# Patient Record
Sex: FEMALE | Race: OTHER | NOT HISPANIC OR LATINO
[De-identification: names, ages, dates, MRNs, and addresses within clinical notes are randomized per-mention and may not be internally consistent; named-entity substitution may affect disease eponyms.]

---

## 2021-02-05 ENCOUNTER — APPOINTMENT (OUTPATIENT)
Dept: OTOLARYNGOLOGY | Facility: CLINIC | Age: 51
End: 2021-02-05
Payer: MEDICAID

## 2021-02-05 VITALS
HEART RATE: 94 BPM | BODY MASS INDEX: 34.73 KG/M2 | DIASTOLIC BLOOD PRESSURE: 94 MMHG | WEIGHT: 196 LBS | SYSTOLIC BLOOD PRESSURE: 124 MMHG | HEIGHT: 63 IN | TEMPERATURE: 96.3 F

## 2021-02-05 DIAGNOSIS — Z78.9 OTHER SPECIFIED HEALTH STATUS: ICD-10-CM

## 2021-02-05 PROCEDURE — 99072 ADDL SUPL MATRL&STAF TM PHE: CPT

## 2021-02-05 PROCEDURE — 99204 OFFICE O/P NEW MOD 45 MIN: CPT | Mod: 25

## 2021-02-05 PROCEDURE — 31231 NASAL ENDOSCOPY DX: CPT

## 2021-02-05 NOTE — ASSESSMENT
[FreeTextEntry1] : 50F here for initial evaluation. She c/o foul nasal odor, thick nasal crusting, blood tinged mucus and congestion for several months. She was told recently that there is a 'hole' in her nose and she is here for evaluation. She has had these sx for nearly 9-10 months.\par In March 2020 she had severe nosebleed - bleeding from nose and mouth, mostly from the left side. She went to ED at [facility] and nasal packing was placed. That was left in place for 1 week and removed, but she rebled 2 days later and was repacked, all while staying in the hospital. This second round of packing was left in place for another week and removed, but she rebled 2-3 days later and was repacked a third time for 7 days; she was finally discharged roughly 4 weeks later with third round of packing, which was removed several days later and she has not rebled since then. However, since this ordeal, she has had the above sx and is here for evaluation.\par On exam, there is minimal saddle nose. Nasal endoscopy shows a 2x2cm anteroinferior septal perforation w surrounding thick crusting and mucus, left septal deflection and a bulbous large right jaspal. The rest of the head and neck exam is unremarkable.\par Her sx are due to anterior septal perforation leading to poor nasal environment and hygiene. 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

## 2021-02-05 NOTE — PHYSICAL EXAM
[Nasal Endoscopy Performed] : nasal endoscopy was performed, see procedure section for findings [Midline] : trachea located in midline position [Normal] : no rashes [de-identified] : slight saddle?

## 2021-02-05 NOTE — CONSULT LETTER
[Dear  ___] : Dear  [unfilled], [Courtesy Letter:] : I had the pleasure of seeing your patient, [unfilled], in my office today. [Consult Closing:] : Thank you very much for allowing me to participate in the care of this patient.  If you have any questions, please do not hesitate to contact me. [Sincerely,] : Sincerely, [FreeTextEntry3] : Julian Lyle MD\par Department of Otolaryngology - Head and Neck Surgery\par [facility]

## 2021-02-05 NOTE — PROCEDURE
[FreeTextEntry3] : Nasal Endoscopy:\par ~2x2cm anteroinferior septal perforation w surrounding thick crusting and mucus, suctioned\par left septal deflection\par bulbous large right MT/jaspal?\par no mucosal masses/lesions\par choana clear, no bleeding

## 2021-02-05 NOTE — HISTORY OF PRESENT ILLNESS
[de-identified] : 50F here for initial evaluation.\par \par She c/o foul nasal odor, thick nasal crusting, blood tinged mucus and congestion for several months.\par She was told recently that there is a 'hole' in her nose and she is here for evaluation.\par \par In March 2020 she had severe nosebleed - bleeding from nose and mouth, mostly from the left side. She went to ED at [facility] and nasal packing was placed. That was left in place for 1 week and removed, but she rebled 2 days later and was repacked, all while staying in the hospital. This second round of packing was left in place for another week and removed, but she rebled 2-3 days later and was repacked a third time for 7 days; she was finally discharged roughly 4 weeks later with packing, which was removed several days later and she has not rebled since then.\par However, since this ordeal, she has had the above sx and is here for evaluation.\par There is no pain.\par No imaging.\par \par ROS otherwise unremarkable.

## 2021-02-13 ENCOUNTER — APPOINTMENT (OUTPATIENT)
Dept: CT IMAGING | Facility: HOSPITAL | Age: 51
End: 2021-02-13

## 2021-02-13 ENCOUNTER — OUTPATIENT (OUTPATIENT)
Dept: OUTPATIENT SERVICES | Facility: HOSPITAL | Age: 51
LOS: 1 days | End: 2021-02-13
Payer: MEDICAID

## 2021-02-13 PROCEDURE — 70486 CT MAXILLOFACIAL W/O DYE: CPT

## 2021-02-13 PROCEDURE — 70486 CT MAXILLOFACIAL W/O DYE: CPT | Mod: 26

## 2021-03-05 ENCOUNTER — APPOINTMENT (OUTPATIENT)
Dept: OTOLARYNGOLOGY | Facility: CLINIC | Age: 51
End: 2021-03-05
Payer: MEDICAID

## 2021-03-05 VITALS
HEART RATE: 89 BPM | WEIGHT: 196 LBS | TEMPERATURE: 97.3 F | SYSTOLIC BLOOD PRESSURE: 137 MMHG | BODY MASS INDEX: 34.73 KG/M2 | DIASTOLIC BLOOD PRESSURE: 92 MMHG | HEIGHT: 63 IN

## 2021-03-05 PROCEDURE — 99072 ADDL SUPL MATRL&STAF TM PHE: CPT

## 2021-03-05 PROCEDURE — 99214 OFFICE O/P EST MOD 30 MIN: CPT

## 2021-03-05 NOTE — HISTORY OF PRESENT ILLNESS
[de-identified] : 50F here in followup.\par \par She c/o foul nasal odor, thick nasal crusting, blood tinged mucus and congestion for several months.\par She was told recently that there is a 'hole' in her nose and she is here for evaluation.\par \par In March 2020 she had severe nosebleed - bleeding from nose and mouth, mostly from the left side. She went to ED at [facility] and nasal packing was placed. That was left in place for 1 week and removed, but she rebled 2 days later and was repacked, all while staying in the hospital. This second round of packing was left in place for another week and removed, but she rebled 2-3 days later and was repacked a third time for 7 days; she was finally discharged roughly 4 weeks later with packing, which was removed several days later and she has not rebled since then.\par However, since this ordeal, she has had the above sx and is here for evaluation.\par There is no pain.\par \par CT Sinus 2/13/21:\par -leftward septal deviation/spurring with a 2cm septal perforation\par -mild inflammatory sinus disease involving the anterior drainage pathways.\par \par ROS otherwise unremarkable.

## 2021-03-05 NOTE — ASSESSMENT
[FreeTextEntry1] : 50F here in followup. She c/o foul nasal odor, thick nasal crusting, blood tinged mucus and congestion for several months. She was told recently that there is a 'hole' in her nose and she is here for evaluation. She has had these sx for nearly 9-10 months.\par In March 2020 she had severe nosebleed - bleeding from nose and mouth, mostly from the left side. She went to ED at [facility] and nasal packing was placed. That was left in place for 1 week and removed, but she rebled 2 days later and was repacked, all while staying in the hospital. This second round of packing was left in place for another week and removed, but she rebled 2-3 days later and was repacked a third time for 7 days; she was finally discharged roughly 4 weeks later with third round of packing, which was removed several days later and she has not rebled since then. However, since this ordeal, she has had the above sx and is here for evaluation.\par CT sinus shows leftward septal deviation/spurring with a 2x2cm anterior septal perforation; there is mild inflammatory sinus disease involving the anterior drainage pathways.\par On exam, there is minimal saddle nose. Nasal endoscopy shows a 2x2cm anteroinferior septal perforation w surrounding thick crusting and mucus, left septal deflection and a bulbous large right jaspal. The rest of the head and neck exam is unremarkable.\par Her constellation of sx are due to anterior septal perforation leading to poor nasal environment and hygiene. 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 At the same time, will due septoplasty and assure middle meatal patency as well given CT. This was discussed at length and all questions answered. Plan for OR in the next 2 months.

## 2021-03-05 NOTE — CONSULT LETTER
[Dear  ___] : Dear  [unfilled], [Courtesy Letter:] : I had the pleasure of seeing your patient, [unfilled], in my office today. [Consult Closing:] : Thank you very much for allowing me to participate in the care of this patient.  If you have any questions, please do not hesitate to contact me. [Sincerely,] : Sincerely, [FreeTextEntry3] : Julian Lyle MD\par Department of Otolaryngology - Head and Neck Surgery\par [facility]

## 2021-03-05 NOTE — PHYSICAL EXAM
[Nasal Endoscopy Performed] : nasal endoscopy was performed, see procedure section for findings [de-identified] : slight saddle? [Midline] : trachea located in midline position [Normal] : no rashes

## 2021-03-05 NOTE — DATA REVIEWED
[de-identified] : CT Sinus 2/13/21:\par FINDINGS: The CT examination demonstrates the nasal septum to be deviated to the left. There is a approximately 18 mm defect within the cartilaginous nasal septum compatible with septal perforation. There is a small left-sided osseous spur. The cribriform plate is intact. The roof of the ethmoid sinus is lower. The rest of the osseous structures appear within normal limits. The vertical struts of the right middle turbinate is pneumatized with a twisted configuration. There is hypertrophy of the left middle turbinate.\par \par The frontal sinuses are well developed. There is an accessory septae within the right frontal sinus. There is a small intraseptal air cell which drains into the left frontal sinus. There is mucosal thickening within the right frontal sinus extending to the frontal sinus outflow tract. There is mucosal thickening within the left frontal sinus extending to the frontal sinus outflow tract. 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 There is mucosal thickening within anterior ethmoid air cells bilaterally. The left sphenoid sinus is underdeveloped with the right being dominant. The sphenoid sinus is clear. The sphenoethmoidal recesses are free of mucosal disease. The carotid canals are well covered by bone.\par \par The left maxillary sinus is underdeveloped. There is minimum mucosal thickening within the maxillary sinuses bilaterally (right greater than left). The osteomeatal complexes are free of mucosal disease.\par \par Evaluation of the nasopharynx demonstrates no masses.\par \par Limited evaluation of the brain parenchyma demonstrates no abnormalities.\par \par IMPRESSION: Leftward septal deviation with a approximately 18 mm septal perforation. Mild inflammatory sinus disease involving the anterior drainage pathways.

## 2021-04-15 ENCOUNTER — APPOINTMENT (OUTPATIENT)
Dept: OTOLARYNGOLOGY | Facility: CLINIC | Age: 51
End: 2021-04-15
Payer: MEDICAID

## 2021-04-15 VITALS
TEMPERATURE: 98 F | HEIGHT: 63 IN | WEIGHT: 196 LBS | DIASTOLIC BLOOD PRESSURE: 84 MMHG | BODY MASS INDEX: 34.73 KG/M2 | SYSTOLIC BLOOD PRESSURE: 141 MMHG | HEART RATE: 89 BPM

## 2021-04-15 PROCEDURE — 99072 ADDL SUPL MATRL&STAF TM PHE: CPT

## 2021-04-15 PROCEDURE — 31231 NASAL ENDOSCOPY DX: CPT

## 2021-04-15 PROCEDURE — 99213 OFFICE O/P EST LOW 20 MIN: CPT | Mod: 25

## 2021-04-15 NOTE — ASSESSMENT
[FreeTextEntry1] : 50F here in followup. Over the past few days she has felt some congestion and headache and wants to make sure everything is ok. She is scheduled for endoscopic sinus surgery/repair anterior septal perforation 4/26/21.\par However, since her sx started, she has been doing better and is improving.\par \par At baseline, she c/o foul nasal odor, thick nasal crusting, blood tinged mucus and congestion for several months. She was told recently that there is a 'hole' in her nose and she is here for evaluation. She has had these sx for nearly 9-10 months.\par In March 2020 she had severe nosebleed - bleeding from nose and mouth, mostly from the left side. She went to ED at [facility] and nasal packing was placed. That was left in place for 1 week and removed, but she rebled 2 days later and was repacked, all while staying in the hospital. This second round of packing was left in place for another week and removed, but she rebled 2-3 days later and was repacked a third time for 7 days; she was finally discharged roughly 4 weeks later with third round of packing, which was removed several days later and she has not rebled since then. However, since this ordeal, she has had the above sx and is here for evaluation.\par CT sinus shows leftward septal deviation/spurring with a 2x2cm anterior septal perforation; there is mild inflammatory sinus disease involving the anterior drainage pathways.\par On exam, there is minimal saddle nose. Nasal endoscopy shows a 2.5x2cm anteroinferior septal perforation w surrounding thick crusting and mucus, left septal deflection and a bulbous large right jaspal. The rest of the head and neck exam is unremarkable. There is no e/o sinusitis today.\par Her constellation of sx are due to anterior septal perforation leading to poor nasal environment and hygiene. 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 At the same time, will due septoplasty and assure middle meatal patency as well given CT. This was discussed at length and all questions answered. Plan for OR as scheduled.

## 2021-04-15 NOTE — PROCEDURE
[FreeTextEntry3] : Nasal Endoscopy:\par ~2.5x2cm anteroinferior septal perforation w surrounding thick crusting and mucus, suctioned\par left septal deflection/spurring, right superior septal deviation\par bulbous large right MT/jaspal, patent right fontanelle\par no mucosal masses/lesions\par no mucopus/polyps\par choana clear, no bleeding

## 2021-04-15 NOTE — DATA REVIEWED
[de-identified] : CT Sinus 2/13/21:\par FINDINGS: The CT examination demonstrates the nasal septum to be deviated to the left. There is a approximately 18 mm defect within the cartilaginous nasal septum compatible with septal perforation. There is a small left-sided osseous spur. The cribriform plate is intact. The roof of the ethmoid sinus is lower. The rest of the osseous structures appear within normal limits. The vertical struts of the right middle turbinate is pneumatized with a twisted configuration. There is hypertrophy of the left middle turbinate.\par \par The frontal sinuses are well developed. There is an accessory septae within the right frontal sinus. There is a small intraseptal air cell which drains into the left frontal sinus. There is mucosal thickening within the right frontal sinus extending to the frontal sinus outflow tract. There is mucosal thickening within the left frontal sinus extending to the frontal sinus outflow tract. 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 There is mucosal thickening within anterior ethmoid air cells bilaterally. The left sphenoid sinus is underdeveloped with the right being dominant. The sphenoid sinus is clear. The sphenoethmoidal recesses are free of mucosal disease. The carotid canals are well covered by bone.\par \par The left maxillary sinus is underdeveloped. There is minimum mucosal thickening within the maxillary sinuses bilaterally (right greater than left). The osteomeatal complexes are free of mucosal disease.\par \par Evaluation of the nasopharynx demonstrates no masses.\par \par Limited evaluation of the brain parenchyma demonstrates no abnormalities.\par \par IMPRESSION: Leftward septal deviation with a approximately 18 mm septal perforation. Mild inflammatory sinus disease involving the anterior drainage pathways.

## 2021-04-15 NOTE — CONSULT LETTER
[Dear  ___] : Dear  [unfilled], [Courtesy Letter:] : I had the pleasure of seeing your patient, [unfilled], in my office today. [Consult Closing:] : Thank you very much for allowing me to participate in the care of this patient.  If you have any questions, please do not hesitate to contact me. [Sincerely,] : Sincerely, [FreeTextEntry3] : Julian Lyle MD\par Department of Otolaryngology - Head and Neck Surgery\par [facility]

## 2021-04-15 NOTE — HISTORY OF PRESENT ILLNESS
[de-identified] : 50F here in followup.\par \par Over the past few days she has felt some congestion and headache and wants to make sure everything is ok. She is scheduled for endoscopic sinus surgery/repair anterior septal perforation 4/26/21.\par Since her sx started, she has been doing better and is improving.\par \par She c/o foul nasal odor, thick nasal crusting, blood tinged mucus and congestion for several months.\par She was told recently that there is a 'hole' in her nose and she is here for evaluation.\par \par In March 2020 she had severe nosebleed - bleeding from nose and mouth, mostly from the left side. She went to ED at [facility] and nasal packing was placed. That was left in place for 1 week and removed, but she rebled 2 days later and was repacked, all while staying in the hospital. This second round of packing was left in place for another week and removed, but she rebled 2-3 days later and was repacked a third time for 7 days; she was finally discharged roughly 4 weeks later with packing, which was removed several days later and she has not rebled since then.\par However, since this ordeal, she has had the above sx and is here for evaluation.\par There is no pain.\par \par CT Sinus 2/13/21:\par -leftward septal deviation/spurring with a 2cm septal perforation\par -mild inflammatory sinus disease involving the anterior drainage pathways.\par \par ROS otherwise unremarkable.

## 2021-04-15 NOTE — PHYSICAL EXAM
[Nasal Endoscopy Performed] : nasal endoscopy was performed, see procedure section for findings [de-identified] : slight saddle? [Midline] : trachea located in midline position [Normal] : no rashes

## 2021-04-25 ENCOUNTER — TRANSCRIPTION ENCOUNTER (OUTPATIENT)
Age: 51
End: 2021-04-25

## 2021-04-26 ENCOUNTER — OUTPATIENT (OUTPATIENT)
Dept: OUTPATIENT SERVICES | Facility: HOSPITAL | Age: 51
LOS: 1 days | Discharge: HOME | End: 2021-04-26
Payer: MEDICAID

## 2021-04-26 ENCOUNTER — RESULT REVIEW (OUTPATIENT)
Age: 51
End: 2021-04-26

## 2021-04-26 ENCOUNTER — APPOINTMENT (OUTPATIENT)
Dept: OTOLARYNGOLOGY | Facility: CLINIC | Age: 51
End: 2021-04-26

## 2021-04-26 LAB
GRAM STN FLD: SIGNIFICANT CHANGE UP
SPECIMEN SOURCE: SIGNIFICANT CHANGE UP

## 2021-04-26 PROCEDURE — 31256 EXPLORATION MAXILLARY SINUS: CPT | Mod: 50

## 2021-04-26 PROCEDURE — 30520 REPAIR OF NASAL SEPTUM: CPT

## 2021-04-26 PROCEDURE — 61782 SCAN PROC CRANIAL EXTRA: CPT

## 2021-04-26 PROCEDURE — 88305 TISSUE EXAM BY PATHOLOGIST: CPT | Mod: 26

## 2021-04-26 PROCEDURE — 88304 TISSUE EXAM BY PATHOLOGIST: CPT | Mod: 26

## 2021-04-26 PROCEDURE — 88311 DECALCIFY TISSUE: CPT | Mod: 26

## 2021-04-26 PROCEDURE — 30130 EXCISE INFERIOR TURBINATE: CPT | Mod: 50

## 2021-04-26 PROCEDURE — 15576 PEDICLE E/N/E/L/NTRORAL: CPT

## 2021-04-26 PROCEDURE — 88300 SURGICAL PATH GROSS: CPT | Mod: 26,59

## 2021-04-26 PROCEDURE — 31254 NSL/SINS NDSC W/PRTL ETHMDCT: CPT | Mod: 50

## 2021-04-26 PROCEDURE — 30630 REPAIR NASAL SEPTUM DEFECT: CPT

## 2021-04-30 LAB
-  AMOXICILLIN/CLAVULANIC ACID: SIGNIFICANT CHANGE UP
-  CEFTRIAXONE: SIGNIFICANT CHANGE UP
-  CIPROFLOXACIN: SIGNIFICANT CHANGE UP
-  TRIMETHOPRIM/SULFAMETHOXAZOLE: SIGNIFICANT CHANGE UP
CULTURE RESULTS: SIGNIFICANT CHANGE UP
METHOD TYPE: SIGNIFICANT CHANGE UP
ORGANISM # SPEC MICROSCOPIC CNT: SIGNIFICANT CHANGE UP
ORGANISM # SPEC MICROSCOPIC CNT: SIGNIFICANT CHANGE UP
SPECIMEN SOURCE: SIGNIFICANT CHANGE UP

## 2021-05-01 LAB — SURGICAL PATHOLOGY STUDY: SIGNIFICANT CHANGE UP

## 2021-05-06 ENCOUNTER — APPOINTMENT (OUTPATIENT)
Dept: OTOLARYNGOLOGY | Facility: CLINIC | Age: 51
End: 2021-05-06
Payer: MEDICAID

## 2021-05-06 VITALS
HEIGHT: 63 IN | SYSTOLIC BLOOD PRESSURE: 147 MMHG | WEIGHT: 189 LBS | TEMPERATURE: 96.2 F | DIASTOLIC BLOOD PRESSURE: 97 MMHG | BODY MASS INDEX: 33.49 KG/M2 | HEART RATE: 92 BPM

## 2021-05-06 PROCEDURE — 31237 NSL/SINS NDSC SURG BX POLYPC: CPT | Mod: 58

## 2021-05-06 PROCEDURE — 99024 POSTOP FOLLOW-UP VISIT: CPT

## 2021-05-06 NOTE — CONSULT LETTER
[Dear  ___] : Dear  [unfilled], [Courtesy Letter:] : I had the pleasure of seeing your patient, [unfilled], in my office today. [Consult Closing:] : Thank you very much for allowing me to participate in the care of this patient.  If you have any questions, please do not hesitate to contact me. [Sincerely,] : Sincerely, [FreeTextEntry3] : Julian Lyle MD\par Department of Otolaryngology - Head and Neck Surgery\par [facility]

## 2021-05-06 NOTE — PHYSICAL EXAM
[Nasal Endoscopy Performed] : nasal endoscopy was performed, see procedure section for findings [de-identified] : slight saddle? [Midline] : trachea located in midline position [Normal] : no rashes

## 2021-05-06 NOTE — PROCEDURE
[FreeTextEntry3] : minipropels removed\par \par Nasal Endoscopy:\par abundant coagulum and crust removed\par nasal airways patent\par silastics in place; underlying septum/septal flap appears viable and pink w some intervening clot\par middle meati widely patent, no mucopus or polyps

## 2021-05-06 NOTE — ASSESSMENT
[FreeTextEntry1] : 50F here in first postoperative visit s/p ESS (maxillary, anterior ethmoid) and endoscopic repair of 2cm anterior septal perforation with pedicled anterior ethmoid artery flap 4/26/21. Intraoperatively, she had hypoplastic maxillaries; the pedicled flap had adequate perforation closure with minimal tension. She is doing ok since surgery. She took the medications as prescribed. She is congested. Pain is controlled.\par On exam, nasal endoscopy shows patent middle meati; the silastics are in place and the underlying septum/septal flap appears viable and pink w some intervening clot. She felt much better after debridement.\par She is doing well for now. To use saline throughout the day and advance activity. I am cautiously optimistic regarding the flaps viability. RTO 2 weeks for silastic removal.

## 2021-05-06 NOTE — DATA REVIEWED
[de-identified] : CT Sinus 2/13/21:\par FINDINGS: The CT examination demonstrates the nasal septum to be deviated to the left. There is a approximately 18 mm defect within the cartilaginous nasal septum compatible with septal perforation. There is a small left-sided osseous spur. The cribriform plate is intact. The roof of the ethmoid sinus is lower. The rest of the osseous structures appear within normal limits. The vertical struts of the right middle turbinate is pneumatized with a twisted configuration. There is hypertrophy of the left middle turbinate.\par \par The frontal sinuses are well developed. There is an accessory septae within the right frontal sinus. There is a small intraseptal air cell which drains into the left frontal sinus. There is mucosal thickening within the right frontal sinus extending to the frontal sinus outflow tract. There is mucosal thickening within the left frontal sinus extending to the frontal sinus outflow tract. 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 There is mucosal thickening within anterior ethmoid air cells bilaterally. The left sphenoid sinus is underdeveloped with the right being dominant. The sphenoid sinus is clear. The sphenoethmoidal recesses are free of mucosal disease. The carotid canals are well covered by bone.\par \par The left maxillary sinus is underdeveloped. There is minimum mucosal thickening within the maxillary sinuses bilaterally (right greater than left). The osteomeatal complexes are free of mucosal disease.\par \par Evaluation of the nasopharynx demonstrates no masses.\par \par Limited evaluation of the brain parenchyma demonstrates no abnormalities.\par \par IMPRESSION: Leftward septal deviation with a approximately 18 mm septal perforation. Mild inflammatory sinus disease involving the anterior drainage pathways.

## 2021-05-06 NOTE — HISTORY OF PRESENT ILLNESS
[de-identified] : 50F here in first postoperative visit s/p ESS (maxillary, anterior ethmoid) and endoscopic repair of 2cm anterior septal perforation with pedicled anterior ethmoid artery flap 4/26/21. Intraoperatively, she had hypoplastic maxillaries; the pedicled flap had adequate perforation closure with minimal tension.\par \par She is doing ok since surgery. She took the medications as prescribed. She is congested. Pain is controlled.\par \par Pathology:\par 1. Left sinus contents:\par - Bone and respiratory mucosa with thickened subepithelial collagen.\par 2. Left maxillary sinus:\par - Inspissated mucus.\par - No additional tissue represented.\par 3. Right sinus contents:\par - Bone and respiratory mucosa with focally thickened subepithelial collagen.\par 4. Bilateral sinus shavings:\par - Fragments of bone and respiratory mucosa with focally thickened subepithelial collagen.\par 5. Nasal septum:\par - Fragments of nasal bone and cartilage (gross exam ).\par \par ROS otherwise unremarkable.

## 2021-05-21 ENCOUNTER — APPOINTMENT (OUTPATIENT)
Dept: OTOLARYNGOLOGY | Facility: CLINIC | Age: 51
End: 2021-05-21
Payer: MEDICAID

## 2021-05-21 PROCEDURE — 31237 NSL/SINS NDSC SURG BX POLYPC: CPT | Mod: 58

## 2021-05-21 PROCEDURE — 99024 POSTOP FOLLOW-UP VISIT: CPT

## 2021-05-21 NOTE — CONSULT LETTER
[Dear  ___] : Dear  [unfilled], [Courtesy Letter:] : I had the pleasure of seeing your patient, [unfilled], in my office today. [Consult Closing:] : Thank you very much for allowing me to participate in the care of this patient.  If you have any questions, please do not hesitate to contact me. [Sincerely,] : Sincerely, [FreeTextEntry3] : Julian Lyle MD\par Department of Otolaryngology - Head and Neck Surgery\par [facility]

## 2021-05-21 NOTE — PHYSICAL EXAM
[Nasal Endoscopy Performed] : nasal endoscopy was performed, see procedure section for findings [de-identified] : slight saddle? [Midline] : trachea located in midline position [Normal] : no rashes

## 2021-05-21 NOTE — ASSESSMENT
[FreeTextEntry1] : 51F here in second postoperative visit s/p ESS (maxillary, anterior ethmoid) and endoscopic repair of 2cm anterior septal perforation with pedicled anterior ethmoid artery flap 4/26/21. Intraoperatively, she had hypoplastic maxillaries; the pedicled flap had adequate perforation closure with minimal tension. She is doing ok since last seen. She has a sore throat and feels fatigued. She is congested. Pain is controlled.\par On exam, after debridement and silastic removal, nasal endoscopy shows a pink and healthy flap w complete perforation closure! Both middle meati are widely patent and the left nasal floor is granulating. \par She is doing well for now. Start budesonide rinses gently once daily. Mupirocin to vestibule. I am optimistic regarding the flaps viability. RTO 3-4 weeks in routine followup.; current sx should improve now that silastic and crusting has been removed.

## 2021-05-21 NOTE — HISTORY OF PRESENT ILLNESS
[de-identified] : 51F here in second postoperative visit s/p ESS (maxillary, anterior ethmoid) and endoscopic repair of 2cm anterior septal perforation with pedicled anterior ethmoid artery flap 4/26/21. Intraoperatively, she had hypoplastic maxillaries; the pedicled flap had adequate perforation closure with minimal tension.\par \par She is doing ok since last seen. She has a sore throat and feels fatigued. She is congested. Pain is controlled.\par \par ROS otherwise unremarkable.

## 2021-05-21 NOTE — PROCEDURE
[FreeTextEntry3] : silastics removed\par \par Nasal Endoscopy:\par abundant crusting removed\par nasal airways patent\par flap pink and healthy w complete perforation closure!\par middle meati widely patent, no mucopus or polyps\par left nasal floor granulating

## 2021-05-21 NOTE — DATA REVIEWED
[de-identified] : CT Sinus 2/13/21:\par FINDINGS: The CT examination demonstrates the nasal septum to be deviated to the left. There is a approximately 18 mm defect within the cartilaginous nasal septum compatible with septal perforation. There is a small left-sided osseous spur. The cribriform plate is intact. The roof of the ethmoid sinus is lower. The rest of the osseous structures appear within normal limits. The vertical struts of the right middle turbinate is pneumatized with a twisted configuration. There is hypertrophy of the left middle turbinate.\par \par The frontal sinuses are well developed. There is an accessory septae within the right frontal sinus. There is a small intraseptal air cell which drains into the left frontal sinus. There is mucosal thickening within the right frontal sinus extending to the frontal sinus outflow tract. There is mucosal thickening within the left frontal sinus extending to the frontal sinus outflow tract. 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 There is mucosal thickening within anterior ethmoid air cells bilaterally. The left sphenoid sinus is underdeveloped with the right being dominant. The sphenoid sinus is clear. The sphenoethmoidal recesses are free of mucosal disease. The carotid canals are well covered by bone.\par \par The left maxillary sinus is underdeveloped. There is minimum mucosal thickening within the maxillary sinuses bilaterally (right greater than left). The osteomeatal complexes are free of mucosal disease.\par \par Evaluation of the nasopharynx demonstrates no masses.\par \par Limited evaluation of the brain parenchyma demonstrates no abnormalities.\par \par IMPRESSION: Leftward septal deviation with a approximately 18 mm septal perforation. Mild inflammatory sinus disease involving the anterior drainage pathways.

## 2021-06-11 ENCOUNTER — APPOINTMENT (OUTPATIENT)
Dept: OTOLARYNGOLOGY | Facility: CLINIC | Age: 51
End: 2021-06-11
Payer: MEDICAID

## 2021-06-11 VITALS
HEIGHT: 63 IN | DIASTOLIC BLOOD PRESSURE: 93 MMHG | BODY MASS INDEX: 34.02 KG/M2 | SYSTOLIC BLOOD PRESSURE: 141 MMHG | TEMPERATURE: 96 F | WEIGHT: 192 LBS | HEART RATE: 94 BPM

## 2021-06-11 PROCEDURE — 31237 NSL/SINS NDSC SURG BX POLYPC: CPT | Mod: 58

## 2021-06-11 PROCEDURE — 99024 POSTOP FOLLOW-UP VISIT: CPT

## 2021-06-11 NOTE — PHYSICAL EXAM
[Nasal Endoscopy Performed] : nasal endoscopy was performed, see procedure section for findings [de-identified] : slight saddle? [Midline] : trachea located in midline position [Normal] : no rashes

## 2021-06-11 NOTE — PROCEDURE
[FreeTextEntry3] : Nasal Endoscopy:\par mild/moderate crusting removed\par nasal airways patent\par septum intact with flap pink and healthy w complete perforation closure!\par middle meati widely patent, no mucopus or polyps\par left nasal floor and posterior septum mucosalized

## 2021-06-11 NOTE — ASSESSMENT
[FreeTextEntry1] : 51F here in routine postoperative visit s/p ESS (maxillary, anterior ethmoid) and endoscopic repair of 2cm anterior septal perforation with pedicled anterior ethmoid artery flap 4/26/21. Intraoperatively, she had hypoplastic maxillaries; the pedicled flap had adequate perforation closure with minimal tension. She is doing much better since last seen. She is using the budesonide rinses and ointment. There is mild sore throat. Pain is controlled.\par On exam, after debridement, nasal endoscopy shows an intact septum with a healthy flap w complete perforation closure! Both middle meati are widely patent and the left nasal floor/posterior septum are mucosalized.\par She is doing very well and her flap has healed beautifully. Continue budesonide rinses gently once daily. Mupirocin to vestibule. RTO 2 months in routine followup.

## 2021-06-11 NOTE — HISTORY OF PRESENT ILLNESS
[de-identified] : 51F here in routine postoperative visit s/p ESS (maxillary, anterior ethmoid) and endoscopic repair of 2cm anterior septal perforation with pedicled anterior ethmoid artery flap 4/26/21. Intraoperatively, she had hypoplastic maxillaries; the pedicled flap had adequate perforation closure with minimal tension.\par \par She is doing much better since last seen. She is using the budesonide rinses and ointment. There is mild sore throat. Pain is controlled.\par \par ROS otherwise unremarkable.

## 2021-06-11 NOTE — CONSULT LETTER
[Dear  ___] : Dear  [unfilled], [Courtesy Letter:] : I had the pleasure of seeing your patient, [unfilled], in my office today. [Consult Closing:] : Thank you very much for allowing me to participate in the care of this patient.  If you have any questions, please do not hesitate to contact me. [Sincerely,] : Sincerely, [FreeTextEntry3] : Julian Lyle MD\par Department of Otolaryngology - Head and Neck Surgery\par [facility]

## 2021-06-11 NOTE — DATA REVIEWED
[de-identified] : CT Sinus 2/13/21:\par FINDINGS: The CT examination demonstrates the nasal septum to be deviated to the left. There is a approximately 18 mm defect within the cartilaginous nasal septum compatible with septal perforation. There is a small left-sided osseous spur. The cribriform plate is intact. The roof of the ethmoid sinus is lower. The rest of the osseous structures appear within normal limits. The vertical struts of the right middle turbinate is pneumatized with a twisted configuration. There is hypertrophy of the left middle turbinate.\par \par The frontal sinuses are well developed. There is an accessory septae within the right frontal sinus. There is a small intraseptal air cell which drains into the left frontal sinus. There is mucosal thickening within the right frontal sinus extending to the frontal sinus outflow tract. There is mucosal thickening within the left frontal sinus extending to the frontal sinus outflow tract. 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 There is mucosal thickening within anterior ethmoid air cells bilaterally. The left sphenoid sinus is underdeveloped with the right being dominant. The sphenoid sinus is clear. The sphenoethmoidal recesses are free of mucosal disease. The carotid canals are well covered by bone.\par \par The left maxillary sinus is underdeveloped. There is minimum mucosal thickening within the maxillary sinuses bilaterally (right greater than left). The osteomeatal complexes are free of mucosal disease.\par \par Evaluation of the nasopharynx demonstrates no masses.\par \par Limited evaluation of the brain parenchyma demonstrates no abnormalities.\par \par IMPRESSION: Leftward septal deviation with a approximately 18 mm septal perforation. Mild inflammatory sinus disease involving the anterior drainage pathways.

## 2021-07-23 ENCOUNTER — APPOINTMENT (OUTPATIENT)
Dept: OTOLARYNGOLOGY | Facility: CLINIC | Age: 51
End: 2021-07-23
Payer: MEDICAID

## 2021-07-23 VITALS
SYSTOLIC BLOOD PRESSURE: 157 MMHG | HEART RATE: 87 BPM | TEMPERATURE: 97.1 F | DIASTOLIC BLOOD PRESSURE: 97 MMHG | HEIGHT: 63 IN | WEIGHT: 192 LBS | BODY MASS INDEX: 34.02 KG/M2

## 2021-07-23 PROCEDURE — 99213 OFFICE O/P EST LOW 20 MIN: CPT | Mod: 25

## 2021-07-23 PROCEDURE — 31231 NASAL ENDOSCOPY DX: CPT | Mod: 58,52

## 2021-07-23 NOTE — HISTORY OF PRESENT ILLNESS
[de-identified] : 51F here in routine postoperative visit s/p ESS (maxillary, anterior ethmoid) and endoscopic repair of 2cm anterior septal perforation with pedicled anterior ethmoid artery flap 4/26/21. Intraoperatively, she had hypoplastic maxillaries; the pedicled flap had adequate perforation closure with minimal tension.\par \par She is doing very well since last seen. She is using the saline rinses, but stopped w the budesonide. There is mild dry throat.\par \par ROS otherwise unremarkable.

## 2021-07-23 NOTE — ASSESSMENT
[FreeTextEntry1] : 51F here in routine postoperative visit s/p ESS (maxillary, anterior ethmoid) and endoscopic repair of 2cm anterior septal perforation with pedicled anterior ethmoid artery flap 4/26/21. Intraoperatively, she had hypoplastic maxillaries; the pedicled flap had adequate perforation closure with minimal tension. She is doing much better since last seen. She is using saline rinses, but stopped the budesonide. There is mild dry throat.\par On exam, after debridement, nasal endoscopy shows an intact septum with a healthy flap w complete perforation closure! Both middle meati are patent and the left nasal floor/posterior septum are mucosalized w some granulation. She is doing very well and her flap has healed beautifully. Restart budesonide rinses gently once daily. RTO 2 months in routine followup.

## 2021-07-23 NOTE — DATA REVIEWED
[de-identified] : CT Sinus 2/13/21:\par FINDINGS: The CT examination demonstrates the nasal septum to be deviated to the left. There is a approximately 18 mm defect within the cartilaginous nasal septum compatible with septal perforation. There is a small left-sided osseous spur. The cribriform plate is intact. The roof of the ethmoid sinus is lower. The rest of the osseous structures appear within normal limits. The vertical struts of the right middle turbinate is pneumatized with a twisted configuration. There is hypertrophy of the left middle turbinate.\par \par The frontal sinuses are well developed. There is an accessory septae within the right frontal sinus. There is a small intraseptal air cell which drains into the left frontal sinus. There is mucosal thickening within the right frontal sinus extending to the frontal sinus outflow tract. There is mucosal thickening within the left frontal sinus extending to the frontal sinus outflow tract. 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 There is mucosal thickening within anterior ethmoid air cells bilaterally. The left sphenoid sinus is underdeveloped with the right being dominant. The sphenoid sinus is clear. The sphenoethmoidal recesses are free of mucosal disease. The carotid canals are well covered by bone.\par \par The left maxillary sinus is underdeveloped. There is minimum mucosal thickening within the maxillary sinuses bilaterally (right greater than left). The osteomeatal complexes are free of mucosal disease.\par \par Evaluation of the nasopharynx demonstrates no masses.\par \par Limited evaluation of the brain parenchyma demonstrates no abnormalities.\par \par IMPRESSION: Leftward septal deviation with a approximately 18 mm septal perforation. Mild inflammatory sinus disease involving the anterior drainage pathways.

## 2021-07-23 NOTE — PHYSICAL EXAM
[Nasal Endoscopy Performed] : nasal endoscopy was performed, see procedure section for findings [Midline] : trachea located in midline position [Normal] : no rashes [de-identified] : slight saddle?

## 2021-07-23 NOTE — PROCEDURE
[FreeTextEntry3] : Nasal Endoscopy:\par mild/moderate left>right crusting removed\par nasal airways patent\par septum intact with flap pink and healthy w complete perforation closure\par middle meati widely patent, no mucopus or polyps\par left nasal floor and posterior septum mucosalizing w some granulation

## 2021-09-24 ENCOUNTER — APPOINTMENT (OUTPATIENT)
Dept: OTOLARYNGOLOGY | Facility: CLINIC | Age: 51
End: 2021-09-24
Payer: MEDICAID

## 2021-09-24 PROCEDURE — 31231 NASAL ENDOSCOPY DX: CPT

## 2021-09-24 PROCEDURE — 99213 OFFICE O/P EST LOW 20 MIN: CPT | Mod: 25

## 2021-09-24 NOTE — PHYSICAL EXAM
[Nasal Endoscopy Performed] : nasal endoscopy was performed, see procedure section for findings [de-identified] : slight saddle? [Midline] : trachea located in midline position [Normal] : no rashes

## 2021-09-24 NOTE — PROCEDURE
[FreeTextEntry3] : Nasal Endoscopy:\par minimal crusting\par nasal airways patent\par septum intact with flap pink and healthy w complete perforation closure!\par middle meati widely patent, no mucopus or polyps\par left nasal floor and posterior septum mucosalized; mild synechiae left nasal floor/posterior IT

## 2021-09-24 NOTE — HISTORY OF PRESENT ILLNESS
[de-identified] : 51F here in routine postoperative visit s/p ESS (maxillary, anterior ethmoid) and endoscopic repair of 2cm anterior septal perforation with pedicled anterior ethmoid artery flap 4/26/21. Intraoperatively, she had hypoplastic maxillaries; the pedicled flap had adequate perforation closure with minimal tension.\par \par She is doing very well since last seen. She is using the budesonide rinses. She feels very well and is breathing great.\par \par ROS otherwise unremarkable.

## 2021-09-24 NOTE — DATA REVIEWED
[de-identified] : CT Sinus 2/13/21:\par FINDINGS: The CT examination demonstrates the nasal septum to be deviated to the left. There is a approximately 18 mm defect within the cartilaginous nasal septum compatible with septal perforation. There is a small left-sided osseous spur. The cribriform plate is intact. The roof of the ethmoid sinus is lower. The rest of the osseous structures appear within normal limits. The vertical struts of the right middle turbinate is pneumatized with a twisted configuration. There is hypertrophy of the left middle turbinate.\par \par The frontal sinuses are well developed. There is an accessory septae within the right frontal sinus. There is a small intraseptal air cell which drains into the left frontal sinus. There is mucosal thickening within the right frontal sinus extending to the frontal sinus outflow tract. There is mucosal thickening within the left frontal sinus extending to the frontal sinus outflow tract. 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 There is mucosal thickening within anterior ethmoid air cells bilaterally. The left sphenoid sinus is underdeveloped with the right being dominant. The sphenoid sinus is clear. The sphenoethmoidal recesses are free of mucosal disease. The carotid canals are well covered by bone.\par \par The left maxillary sinus is underdeveloped. There is minimum mucosal thickening within the maxillary sinuses bilaterally (right greater than left). The osteomeatal complexes are free of mucosal disease.\par \par Evaluation of the nasopharynx demonstrates no masses.\par \par Limited evaluation of the brain parenchyma demonstrates no abnormalities.\par \par IMPRESSION: Leftward septal deviation with a approximately 18 mm septal perforation. Mild inflammatory sinus disease involving the anterior drainage pathways.

## 2021-09-24 NOTE — ASSESSMENT
[FreeTextEntry1] : 51F here in routine postoperative visit s/p ESS (maxillary, anterior ethmoid) and endoscopic repair of 2cm anterior septal perforation with pedicled anterior ethmoid artery flap 4/26/21. Intraoperatively, she had hypoplastic maxillaries; the pedicled flap had adequate perforation closure with minimal tension. She is doing very well since last seen 2 months ago. She is using the budesonide rinses. She feels very well and is breathing great.\par On exam, nasal endoscopy shows well healed postoperative changes with an intact septum and a healthy flap w complete perforation closure! Both middle meati are patent and the left nasal floor/posterior septum are mucosalized. She is doing very well and her flap has healed beautifully. Continue budesonide rinses as needed. To use mupirocin ointment over the left vestibule. RTO 4 months in routine followup.

## 2021-09-24 NOTE — CONSULT LETTER
[Dear  ___] : Dear  [unfilled], [Courtesy Letter:] : I had the pleasure of seeing your patient, [unfilled], in my office today. [Consult Closing:] : Thank you very much for allowing me to participate in the care of this patient.  If you have any questions, please do not hesitate to contact me. [Sincerely,] : Sincerely, [FreeTextEntry3] : Julian Lyle MD\par Department of Otolaryngology - Head and Neck Surgery\par [facility]

## 2021-11-03 ENCOUNTER — APPOINTMENT (OUTPATIENT)
Dept: PULMONOLOGY | Facility: CLINIC | Age: 51
End: 2021-11-03
Payer: MEDICAID

## 2021-11-03 VITALS
TEMPERATURE: 96.9 F | HEIGHT: 60 IN | BODY MASS INDEX: 36.71 KG/M2 | OXYGEN SATURATION: 98 % | DIASTOLIC BLOOD PRESSURE: 76 MMHG | HEART RATE: 117 BPM | WEIGHT: 187 LBS | SYSTOLIC BLOOD PRESSURE: 104 MMHG

## 2021-11-03 DIAGNOSIS — R05.3 CHRONIC COUGH: ICD-10-CM

## 2021-11-03 PROCEDURE — 95012 NITRIC OXIDE EXP GAS DETER: CPT

## 2021-11-03 PROCEDURE — 99204 OFFICE O/P NEW MOD 45 MIN: CPT | Mod: 25,GC

## 2021-11-03 PROCEDURE — 94729 DIFFUSING CAPACITY: CPT

## 2021-11-03 PROCEDURE — 94727 GAS DIL/WSHOT DETER LNG VOL: CPT

## 2021-11-03 PROCEDURE — 71046 X-RAY EXAM CHEST 2 VIEWS: CPT

## 2021-11-03 PROCEDURE — 94060 EVALUATION OF WHEEZING: CPT

## 2021-11-04 NOTE — PHYSICAL EXAM
[No Acute Distress] : no acute distress [Normal Appearance] : normal appearance [No Neck Mass] : no neck mass [Normal Rate/Rhythm] : normal rate/rhythm [Normal S1, S2] : normal s1, s2 [No Murmurs] : no murmurs [No Resp Distress] : no resp distress [No Abnormalities] : no abnormalities [Benign] : benign [No Clubbing] : no clubbing [No Edema] : no edema [Normal Color/ Pigmentation] : normal color/ pigmentation [Normal Affect] : normal affect [TextBox_68] : Trace basilar inspiratory crackles

## 2021-11-04 NOTE — CONSULT LETTER
[Dear  ___] : Dear  [unfilled], [( Thank you for referring [unfilled] for consultation for _____ )] : Thank you for referring [unfilled] for consultation for [unfilled] [Please see my note below.] : Please see my note below. [Consult Closing:] : Thank you very much for allowing me to participate in the care of this patient.  If you have any questions, please do not hesitate to contact me. [Sincerely,] : Sincerely, [FreeTextEntry2] : [facility]\par John Fairbanks MD\par [address] \[place] [address]\par  [FreeTextEntry3] : Love Thompson MD, FCCP\par

## 2021-11-04 NOTE — ASSESSMENT
[FreeTextEntry1] : Data reviewed:\par \par PA/lat CXR in office 11/03/2021 : clear lungs\par \par PFT 11/03/2021 : entirely normal, no BD response / FENO 18\par \par Impression:\par Chronic Cough\par \par Plan:\par Patient is a never smoker w/ no PMHx of lung disease with chronic dry cough following covid infection in 3/2020 and persisted following nasal surgery in 4/21. CXR is unremarkable and PFT shows no restrictive or obstructive pathology with normal DLCO. FENO is normal which indicates symptoms are not likely due to eosinophilic bronchitis but does not rule out asthma. Less likely reactive airway disease from chemical exposure at work due to lack of temporal relationship of symptoms. Most likely differentials include GERD, cough variant asthma or post nasal drip. Will start patient on empiric PPI for treatment of GERD and refer her to GI. She will return to the office for a methacholine challenge test to definitively rule out asthma.\par --\par Attending Addendum\par \par Seen and examined by me and agree w above. Chronic cough since having Covid early in pandemic in never smoker w no pre-existing lung disease. Upper airway disease managed by Dr Lyle. No symptoms of GERD. Will empirically try PPIs but per her preference will also refer to GI, and will bring her back for methacholine challenge to definitively exclude asthma.

## 2021-11-04 NOTE — REVIEW OF SYSTEMS
[Cough] : cough [SOB on Exertion] : sob on exertion [Negative] : Musculoskeletal [Chest Tightness] : no chest tightness [Sputum] : no sputum [TextBox_14] : Dry throat

## 2021-11-04 NOTE — HISTORY OF PRESENT ILLNESS
[TextBox_4] : 11/3/21 [Fredyenberger]: Ms. Serna is a 52 yo F, never smoker with no significant PMHx who presents for a chronic cough. She reports she had an episode of significant epistaxis in 3/2020 for which she was hospitalized at [facility] and caught covid w/ course c/b PE (took eliquis for 6 months) and development of a perforated septum. She saw ENT 4/2021 and underwent restorative surgery and currently is taking Flonase twice weekly and budesonide nasal rinse daily. Since her COVID-19 infection, has had dry cough, worse at night, with some occasional wheezing. Feels some PINEDO, occasional SOB when sitting at home. About 1 month ago she had left sided chest pain for which she went to [facility] and they found nothing. Denies any heartburn or GERD symptoms. No personal or family history of asthma. 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 No pets, birds or mold at home. \par \par

## 2021-12-17 ENCOUNTER — LABORATORY RESULT (OUTPATIENT)
Age: 51
End: 2021-12-17

## 2021-12-17 ENCOUNTER — APPOINTMENT (OUTPATIENT)
Dept: GASTROENTEROLOGY | Facility: CLINIC | Age: 51
End: 2021-12-17
Payer: MEDICAID

## 2021-12-17 VITALS
WEIGHT: 194 LBS | SYSTOLIC BLOOD PRESSURE: 106 MMHG | TEMPERATURE: 97.9 F | HEART RATE: 98 BPM | BODY MASS INDEX: 38.09 KG/M2 | RESPIRATION RATE: 16 BRPM | HEIGHT: 60 IN | OXYGEN SATURATION: 98 % | DIASTOLIC BLOOD PRESSURE: 65 MMHG

## 2021-12-17 DIAGNOSIS — Z00.00 ENCOUNTER FOR GENERAL ADULT MEDICAL EXAMINATION W/OUT ABNORMAL FINDINGS: ICD-10-CM

## 2021-12-17 PROCEDURE — 99214 OFFICE O/P EST MOD 30 MIN: CPT | Mod: 25

## 2021-12-17 PROCEDURE — 99204 OFFICE O/P NEW MOD 45 MIN: CPT | Mod: 25

## 2021-12-19 NOTE — HISTORY OF PRESENT ILLNESS
[de-identified] : 50 yo F no significant PMH who presents for evaluation of chronic cough, intermittent epigastric discomfort (has a history of H pylori treated with course of antibiotics in [place], unsure what antibiotics and was not checked for clearance per patient report . \par \par 3/2020 epistaxis, hospitalized at [facility], caught COVID and c/b PE (eliquis x 6 mo, developed perforated septum\par 4/2021 saw ENT and underwent restorative surgery and is now taking flonase twice a week and budesonide rinse daily \par Has had a dry cough since COVID-19 infection , worse at night, with occasional wheezing, has PINEDO, occasional SOB when sitting at home\par 1 mo ago had L sided CP and went to [facility] and found nothing\par Denies heartburn, GERD\par Works as a  and is exposed to celaning products but does not report symptoms w\par \par After COVID having a lot of problems with her tendons. Had a surgery last year because fingers were stuck. \par \par Feels like throat is very dry \par Every night feels throat is dry \par Sometimes feels epigastric pain - it's not all the time, maybe once a week. Has not improved on the PPI. Pain feels burning in nature. Will last 10-20 min at at time. Last time she got it was 1 week ago. No relationship with food. Sometimes feels it at night. Does not wake her up out of sleep. \par \par No changes in bowel habits, no constipation, diarrhea, no blood in stool. \par She was given PPI and takes every morning. Throat is not any better. \par \par Had an EGD done 4 yrs ago and was found to have 'bacteria in the stomach" and does not think she was checked for clearance. SHe did take the medicine they gave her, does not recall which medicine she was given \par \par Sometimes does get heratburn or acid reflux. Can't drink juice but will feel burning or regurgitation at times. \par No problems with dysphagia or odynophagia. \par \par No prior colonoscopy. \par \par PMH: \par high blood sugar, no DM\par H pylori s/p treatment with antibiotic course\par \par PSH: \par c section \par nose surgery\par hand surgery\par \par FH: \par no GI maliganncies that she is aware of\par \par SH: \par never smoker\par no EtOH \par no illicit drug use\par works as , exposed to a lot of chemicals\par \par MED: \par omerpazole at least 30 min before breakfast\par \par ALL: NKDA

## 2021-12-19 NOTE — ASSESSMENT
[FreeTextEntry1] : 50 yo F no significant PMH who presents for evaluation of chronic cough, intermittent epigastric discomfort (has a history of H pylori treated with course of antibiotics in [place], unsure what antibiotics and was not checked for clearance per patient report . \par \par Epigastric burning sensation\par - s/p EGD in [place] 4 yrs ago, thinks it was normal, found to have HP, per patient underwent treatment but unclear what medication and was not checked for clearance\par - continue PPI 30 min before breakfast\par - cannot recheck HP given possibility for false negatives while on the PPI9\par - plan for endoscopy given symptoms of throat dryness that radiates into epigastric region\par \par Dry throat, chronic cough\par - seems to have started after covid\par - has a lot of exposure to chemicals, works as a  on [employer]\par - plan for endoscopy as noted above\par - discussed BRAVO testing with the patient, she wishes to start with just an endoscopy first for structural evaluation\par \par Colorectal cancer screening: no prior colonoscopy and overdue for screening, average risk\par - plan for colonoscopy at the time of the endoscopy for colorectal cancer screening\par - reviewed r/b/a/i of the procedure and patient expressed understanding agrees to proceed\par - reviewed bowel preparation instructions in detail\par - needs escort day of procedure\par - pre-procedure labs to be drawn today\par \par Follow up post-procedure

## 2021-12-19 NOTE — PHYSICAL EXAM
[General Appearance - Alert] : alert [General Appearance - In No Acute Distress] : in no acute distress [General Appearance - Well Nourished] : well nourished [General Appearance - Well Developed] : well developed [General Appearance - Well-Appearing] : healthy appearing [Sclera] : the sclera and conjunctiva were normal [PERRL With Normal Accommodation] : pupils were equal in size, round, and reactive to light [Extraocular Movements] : extraocular movements were intact [Outer Ear] : the ears and nose were normal in appearance [Oropharynx] : the oropharynx was normal [Neck Appearance] : the appearance of the neck was normal [Neck Cervical Mass (___cm)] : no neck mass was observed [Jugular Venous Distention Increased] : there was no jugular-venous distention [Thyroid Diffuse Enlargement] : the thyroid was not enlarged [Thyroid Nodule] : there were no palpable thyroid nodules [Auscultation Breath Sounds / Voice Sounds] : lungs were clear to auscultation bilaterally [Heart Rate And Rhythm] : heart rate was normal and rhythm regular [Heart Sounds] : normal S1 and S2 [Heart Sounds Gallop] : no gallops [Murmurs] : no murmurs [Heart Sounds Pericardial Friction Rub] : no pericardial rub [Edema] : there was no peripheral edema [Bowel Sounds] : normal bowel sounds [Abdomen Soft] : soft [Abdomen Tenderness] : non-tender [Abdomen Mass (___ Cm)] : no abdominal mass palpated [No CVA Tenderness] : no ~M costovertebral angle tenderness [No Spinal Tenderness] : no spinal tenderness [Abnormal Walk] : normal gait [Nail Clubbing] : no clubbing  or cyanosis of the fingernails [Musculoskeletal - Swelling] : no joint swelling seen [Motor Tone] : muscle strength and tone were normal [Skin Color & Pigmentation] : normal skin color and pigmentation [Skin Turgor] : normal skin turgor [] : no rash [Oriented To Time, Place, And Person] : oriented to person, place, and time [Impaired Insight] : insight and judgment were intact [Affect] : the affect was normal

## 2022-01-10 ENCOUNTER — NON-APPOINTMENT (OUTPATIENT)
Age: 52
End: 2022-01-10

## 2022-01-10 ENCOUNTER — APPOINTMENT (OUTPATIENT)
Dept: OPHTHALMOLOGY | Facility: CLINIC | Age: 52
End: 2022-01-10
Payer: MEDICAID

## 2022-01-10 PROCEDURE — 92014 COMPRE OPH EXAM EST PT 1/>: CPT

## 2022-01-10 PROCEDURE — 92004 COMPRE OPH EXAM NEW PT 1/>: CPT

## 2022-01-10 PROCEDURE — 92133 CPTRZD OPH DX IMG PST SGM ON: CPT

## 2022-01-14 ENCOUNTER — APPOINTMENT (OUTPATIENT)
Dept: OTOLARYNGOLOGY | Facility: CLINIC | Age: 52
End: 2022-01-14
Payer: MEDICAID

## 2022-01-14 VITALS
SYSTOLIC BLOOD PRESSURE: 136 MMHG | BODY MASS INDEX: 38.09 KG/M2 | HEIGHT: 60 IN | HEART RATE: 86 BPM | TEMPERATURE: 96.1 F | WEIGHT: 194 LBS | DIASTOLIC BLOOD PRESSURE: 105 MMHG

## 2022-01-14 PROCEDURE — 31231 NASAL ENDOSCOPY DX: CPT

## 2022-01-14 PROCEDURE — 99213 OFFICE O/P EST LOW 20 MIN: CPT | Mod: 25

## 2022-01-14 NOTE — ASSESSMENT
[FreeTextEntry1] : 51F here in routine followup visit s/p ESS (maxillary, anterior ethmoid) and endoscopic repair of 2cm anterior septal perforation with pedicled anterior ethmoid artery flap 4/26/21. Intraoperatively, she had hypoplastic maxillaries; the pedicled flap had adequate perforation closure with minimal tension. She is doing very well since last seen 4 months ago. She is using the budesonide rinses twice weekly and mupirocin around 3 times weekly. She feels very well and is breathing great.\par On exam, nasal endoscopy shows well healed postoperative changes with an intact septum and a healthy flap w complete perforation closure! Both middle meati are patent and the left nasal floor/posterior septum are mucosalized. She is doing very well and her flap has healed. Continue budesonide rinses and mupirocin ointment as needed. RTO 6 months in routine followup.

## 2022-01-14 NOTE — CONSULT LETTER
[Dear  ___] : Dear  [unfilled], [Courtesy Letter:] : I had the pleasure of seeing your patient, [unfilled], in my office today. [Consult Closing:] : Thank you very much for allowing me to participate in the care of this patient.  If you have any questions, please do not hesitate to contact me. [Sincerely,] : Sincerely, [FreeTextEntry3] : Julian Lyle MD\par Department of Otolaryngology - Head and Neck Surgery\par [facility]

## 2022-01-14 NOTE — HISTORY OF PRESENT ILLNESS
[de-identified] : 51F here in routine followup visit.\par \par She is s/p ESS (maxillary, anterior ethmoid) and endoscopic repair of 2cm anterior septal perforation with pedicled anterior ethmoid artery flap 4/26/21. Intraoperatively, she had hypoplastic maxillaries; the pedicled flap had adequate perforation closure with minimal tension.\par \par She is doing very well since last seen. She is using the budesonide rinses twice weekly and mupirocin around 3 times weekly. She feels very well and is breathing great.\par \par ROS otherwise unremarkable.

## 2022-01-14 NOTE — PHYSICAL EXAM
[Nasal Endoscopy Performed] : nasal endoscopy was performed, see procedure section for findings [de-identified] : slight saddle? [Midline] : trachea located in midline position [Normal] : no rashes

## 2022-01-14 NOTE — DATA REVIEWED
[de-identified] : CT Sinus 2/13/21:\par FINDINGS: The CT examination demonstrates the nasal septum to be deviated to the left. There is a approximately 18 mm defect within the cartilaginous nasal septum compatible with septal perforation. There is a small left-sided osseous spur. The cribriform plate is intact. The roof of the ethmoid sinus is lower. The rest of the osseous structures appear within normal limits. The vertical struts of the right middle turbinate is pneumatized with a twisted configuration. There is hypertrophy of the left middle turbinate.\par \par The frontal sinuses are well developed. There is an accessory septae within the right frontal sinus. There is a small intraseptal air cell which drains into the left frontal sinus. There is mucosal thickening within the right frontal sinus extending to the frontal sinus outflow tract. There is mucosal thickening within the left frontal sinus extending to the frontal sinus outflow tract. 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 There is mucosal thickening within anterior ethmoid air cells bilaterally. The left sphenoid sinus is underdeveloped with the right being dominant. The sphenoid sinus is clear. The sphenoethmoidal recesses are free of mucosal disease. The carotid canals are well covered by bone.\par \par The left maxillary sinus is underdeveloped. There is minimum mucosal thickening within the maxillary sinuses bilaterally (right greater than left). The osteomeatal complexes are free of mucosal disease.\par \par Evaluation of the nasopharynx demonstrates no masses.\par \par Limited evaluation of the brain parenchyma demonstrates no abnormalities.\par \par IMPRESSION: Leftward septal deviation with a approximately 18 mm septal perforation. Mild inflammatory sinus disease involving the anterior drainage pathways.

## 2022-01-25 DIAGNOSIS — R79.89 OTHER SPECIFIED ABNORMAL FINDINGS OF BLOOD CHEMISTRY: ICD-10-CM

## 2022-01-25 LAB
25(OH)D3 SERPL-MCNC: 19.2 NG/ML
ALBUMIN SERPL ELPH-MCNC: 5.2 G/DL
ALP BLD-CCNC: 139 U/L
ALT SERPL-CCNC: 13 U/L
ANION GAP SERPL CALC-SCNC: 13 MMOL/L
AST SERPL-CCNC: 14 U/L
BASOPHILS # BLD AUTO: 0.07 K/UL
BASOPHILS NFR BLD AUTO: 0.7 %
BILIRUB SERPL-MCNC: 0.2 MG/DL
BUN SERPL-MCNC: 13 MG/DL
CALCIUM SERPL-MCNC: 10.5 MG/DL
CELIAC DISEASE INTERPRETATION: NORMAL
CELIAC GENE PAIRS PRESENT: YES
CHLORIDE SERPL-SCNC: 102 MMOL/L
CO2 SERPL-SCNC: 27 MMOL/L
CREAT SERPL-MCNC: 0.67 MG/DL
DQ ALPHA 1: NORMAL
DQ BETA 1: NORMAL
EOSINOPHIL # BLD AUTO: 0.9 K/UL
EOSINOPHIL NFR BLD AUTO: 8.4 %
FOLATE SERPL-MCNC: 15.2 NG/ML
GLUCOSE SERPL-MCNC: 103 MG/DL
HCT VFR BLD CALC: 49 %
HGB BLD-MCNC: 15.6 G/DL
IMM GRANULOCYTES NFR BLD AUTO: 0.3 %
IMMUNOGLOBULIN A (IGA): 301 MG/DL
LYMPHOCYTES # BLD AUTO: 2.71 K/UL
LYMPHOCYTES NFR BLD AUTO: 25.3 %
MAN DIFF?: NORMAL
MCHC RBC-ENTMCNC: 31 PG
MCHC RBC-ENTMCNC: 31.8 GM/DL
MCV RBC AUTO: 97.2 FL
MONOCYTES # BLD AUTO: 0.68 K/UL
MONOCYTES NFR BLD AUTO: 6.3 %
NEUTROPHILS # BLD AUTO: 6.34 K/UL
NEUTROPHILS NFR BLD AUTO: 59 %
PLATELET # BLD AUTO: 361 K/UL
POTASSIUM SERPL-SCNC: 5 MMOL/L
PROT SERPL-MCNC: 8.3 G/DL
RBC # BLD: 5.04 M/UL
RBC # FLD: 13.3 %
SODIUM SERPL-SCNC: 142 MMOL/L
VIT B12 SERPL-MCNC: 424 PG/ML
WBC # FLD AUTO: 10.73 K/UL

## 2022-02-11 ENCOUNTER — APPOINTMENT (OUTPATIENT)
Dept: OPHTHALMOLOGY | Facility: CLINIC | Age: 52
End: 2022-02-11
Payer: MEDICAID

## 2022-02-11 ENCOUNTER — NON-APPOINTMENT (OUTPATIENT)
Age: 52
End: 2022-02-11

## 2022-02-11 PROCEDURE — 92012 INTRM OPH EXAM EST PATIENT: CPT

## 2022-02-15 ENCOUNTER — APPOINTMENT (OUTPATIENT)
Dept: OPHTHALMOLOGY | Facility: CLINIC | Age: 52
End: 2022-02-15
Payer: MEDICAID

## 2022-02-15 ENCOUNTER — NON-APPOINTMENT (OUTPATIENT)
Age: 52
End: 2022-02-15

## 2022-02-15 PROCEDURE — 92012 INTRM OPH EXAM EST PATIENT: CPT

## 2022-02-17 ENCOUNTER — RESULT REVIEW (OUTPATIENT)
Age: 52
End: 2022-02-17

## 2022-02-17 ENCOUNTER — APPOINTMENT (OUTPATIENT)
Dept: GASTROENTEROLOGY | Facility: CLINIC | Age: 52
End: 2022-02-17
Payer: MEDICAID

## 2022-02-17 PROCEDURE — 45380 COLONOSCOPY AND BIOPSY: CPT

## 2022-02-17 PROCEDURE — 43239 EGD BIOPSY SINGLE/MULTIPLE: CPT

## 2022-02-23 ENCOUNTER — LABORATORY RESULT (OUTPATIENT)
Age: 52
End: 2022-02-23

## 2022-02-28 ENCOUNTER — APPOINTMENT (OUTPATIENT)
Dept: PULMONOLOGY | Facility: CLINIC | Age: 52
End: 2022-02-28

## 2022-04-18 ENCOUNTER — APPOINTMENT (OUTPATIENT)
Dept: OTOLARYNGOLOGY | Facility: CLINIC | Age: 52
End: 2022-04-18
Payer: MEDICAID

## 2022-04-18 ENCOUNTER — RX RENEWAL (OUTPATIENT)
Age: 52
End: 2022-04-18

## 2022-04-18 VITALS
SYSTOLIC BLOOD PRESSURE: 158 MMHG | DIASTOLIC BLOOD PRESSURE: 94 MMHG | WEIGHT: 194 LBS | BODY MASS INDEX: 38.09 KG/M2 | HEART RATE: 97 BPM | HEIGHT: 60 IN | TEMPERATURE: 96.2 F

## 2022-04-18 PROCEDURE — 99213 OFFICE O/P EST LOW 20 MIN: CPT | Mod: 25

## 2022-04-18 PROCEDURE — 31231 NASAL ENDOSCOPY DX: CPT

## 2022-04-18 NOTE — HISTORY OF PRESENT ILLNESS
[de-identified] : 51F here in routine followup visit.\par \par She is s/p ESS (maxillary, anterior ethmoid) and endoscopic repair of 2cm anterior septal perforation with pedicled anterior ethmoid artery flap 4/26/21. Intraoperatively, she had hypoplastic maxillaries; the pedicled flap had adequate perforation closure with minimal tension.\par \par She is doing very well since last seen. She is using the budesonide rinses twice weekly and mupirocin occasionally. She feels very well and is breathing great.\par \par ROS otherwise unremarkable.

## 2022-04-18 NOTE — DATA REVIEWED
[de-identified] : CT Sinus 2/13/21:\par FINDINGS: The CT examination demonstrates the nasal septum to be deviated to the left. There is a approximately 18 mm defect within the cartilaginous nasal septum compatible with septal perforation. There is a small left-sided osseous spur. The cribriform plate is intact. The roof of the ethmoid sinus is lower. The rest of the osseous structures appear within normal limits. The vertical struts of the right middle turbinate is pneumatized with a twisted configuration. There is hypertrophy of the left middle turbinate.\par \par The frontal sinuses are well developed. There is an accessory septae within the right frontal sinus. There is a small intraseptal air cell which drains into the left frontal sinus. There is mucosal thickening within the right frontal sinus extending to the frontal sinus outflow tract. There is mucosal thickening within the left frontal sinus extending to the frontal sinus outflow tract. 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 There is mucosal thickening within anterior ethmoid air cells bilaterally. The left sphenoid sinus is underdeveloped with the right being dominant. The sphenoid sinus is clear. The sphenoethmoidal recesses are free of mucosal disease. The carotid canals are well covered by bone.\par \par The left maxillary sinus is underdeveloped. There is minimum mucosal thickening within the maxillary sinuses bilaterally (right greater than left). The osteomeatal complexes are free of mucosal disease.\par \par Evaluation of the nasopharynx demonstrates no masses.\par \par Limited evaluation of the brain parenchyma demonstrates no abnormalities.\par \par IMPRESSION: Leftward septal deviation with a approximately 18 mm septal perforation. Mild inflammatory sinus disease involving the anterior drainage pathways.

## 2022-04-18 NOTE — ASSESSMENT
[FreeTextEntry1] : 51F here in routine followup visit s/p ESS (maxillary, anterior ethmoid) and endoscopic repair of 2cm anterior septal perforation with pedicled anterior ethmoid artery flap 4/26/21. Intraoperatively, she had hypoplastic maxillaries; the pedicled flap had adequate perforation closure with minimal tension. She is doing very well since last seen 4 months ago. She is using the budesonide rinses twice weekly and mupirocin occasionally. She feels very well and is breathing great.\par On exam, nasal endoscopy shows well healed postoperative changes with an intact septum and a healthy flap w complete perforation closure! Both middle meati are patent and the left nasal floor/posterior septum are mucosalized. She is doing very well and her flap has healed. Continue budesonide rinses and mupirocin ointment as needed. RTO 6 months in routine followup.

## 2022-04-18 NOTE — PHYSICAL EXAM
[Nasal Endoscopy Performed] : nasal endoscopy was performed, see procedure section for findings [de-identified] : slight saddle? [Midline] : trachea located in midline position [Normal] : no rashes

## 2022-04-18 NOTE — PROCEDURE
[FreeTextEntry3] : Nasal Endoscopy:\par minimal crusting\par nasal airways patent\par septum intact with flap pink and healthy w complete perforation closure\par middle meati widely patent, no mucopus or polyps\par left nasal floor and posterior septum mucosalized; mild synechiae left nasal floor/posterior IT

## 2022-04-18 NOTE — CONSULT LETTER
[Dear  ___] : Dear  [unfilled], [Courtesy Letter:] : I had the pleasure of seeing your patient, [unfilled], in my office today. [Consult Closing:] : Thank you very much for allowing me to participate in the care of this patient.  If you have any questions, please do not hesitate to contact me. [Sincerely,] : Sincerely, [FreeTextEntry3] : Julian Lyle MD\par Department of Otolaryngology - Head and Neck Surgery\par [facility]

## 2022-04-26 LAB — SARS-COV-2 N GENE NPH QL NAA+PROBE: NOT DETECTED

## 2022-04-28 ENCOUNTER — APPOINTMENT (OUTPATIENT)
Dept: PULMONOLOGY | Facility: CLINIC | Age: 52
End: 2022-04-28
Payer: MEDICAID

## 2022-04-28 VITALS
TEMPERATURE: 97.6 F | DIASTOLIC BLOOD PRESSURE: 75 MMHG | HEIGHT: 60 IN | OXYGEN SATURATION: 98 % | WEIGHT: 194 LBS | BODY MASS INDEX: 38.09 KG/M2 | SYSTOLIC BLOOD PRESSURE: 129 MMHG | HEART RATE: 83 BPM

## 2022-04-28 PROCEDURE — 99213 OFFICE O/P EST LOW 20 MIN: CPT | Mod: 25

## 2022-04-28 PROCEDURE — 94070 EVALUATION OF WHEEZING: CPT

## 2022-04-28 PROCEDURE — 95070 INHLJ BRNCL CHALLENGE TSTG: CPT

## 2022-04-28 NOTE — ASSESSMENT
[FreeTextEntry1] : Data reviewed:\par \par PA/lat CXR in office 11/03/2021 : clear lungs\par \par PFT 11/03/2021 : entirely normal, no BD response / FENO 18\par MCT 4/28/22: negative for BHR\par \par Impression:\par Chronic cough\par \par Plan:\par Has a chronic cough after Covid w no specific etiology identified. The cough is mild and infrequent. No additional workup or treatment at this time.

## 2022-04-28 NOTE — HISTORY OF PRESENT ILLNESS
[TextBox_4] : 11/3/21 [Nildaer]: Ms. Serna is a 50 yo F, never smoker with no significant PMHx who presents for a chronic cough. She reports she had an episode of significant epistaxis in 3/2020 for which she was hospitalized at [facility] and caught covid w/ course c/b PE (took eliquis for 6 months) and development of a perforated septum. She saw ENT 4/2021 and underwent restorative surgery and currently is taking Flonase twice weekly and budesonide nasal rinse daily. Since her COVID-19 infection, has had dry cough, worse at night, with some occasional wheezing. Feels some PINEDO, occasional SOB when sitting at home. About 1 month ago she had left sided chest pain for which she went to [facility] and they found nothing. Denies any heartburn or GERD symptoms. No personal or family history of asthma. 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 No pets, birds or mold at home. \par \par 4/28/22: She tried the PPI for one month w no improvement but also saw GI and told no evidence of reflux, had endoscopy, stopped the PPI. The cough is no better. Happens at night sometimes and at least once a week. Does say this time that it may happen when exposed to products at work. Also complains of chest pressure on coughing or with exertion but has seen cards and no heart disease.

## 2022-04-28 NOTE — CONSULT LETTER
[Dear  ___] : Dear  [unfilled], [Courtesy Letter:] : I had the pleasure of seeing your patient, [unfilled], in my office today. [Please see my note below.] : Please see my note below. [Consult Closing:] : Thank you very much for allowing me to participate in the care of this patient.  If you have any questions, please do not hesitate to contact me. [Sincerely,] : Sincerely, [FreeTextEntry2] : John Fairbanks MD\par [facility]\par [address] \[place] [address]\par  [FreeTextEntry3] : Love Thompson MD, FCCP\par

## 2022-05-13 ENCOUNTER — NON-APPOINTMENT (OUTPATIENT)
Age: 52
End: 2022-05-13

## 2022-05-13 ENCOUNTER — APPOINTMENT (OUTPATIENT)
Dept: OPHTHALMOLOGY | Facility: CLINIC | Age: 52
End: 2022-05-13
Payer: MEDICAID

## 2022-05-13 PROCEDURE — 92014 COMPRE OPH EXAM EST PT 1/>: CPT

## 2022-05-13 PROCEDURE — 92083 EXTENDED VISUAL FIELD XM: CPT

## 2022-10-14 ENCOUNTER — APPOINTMENT (OUTPATIENT)
Dept: OTOLARYNGOLOGY | Facility: CLINIC | Age: 52
End: 2022-10-14

## 2022-10-14 VITALS
BODY MASS INDEX: 36.52 KG/M2 | HEIGHT: 60 IN | WEIGHT: 186 LBS | DIASTOLIC BLOOD PRESSURE: 81 MMHG | SYSTOLIC BLOOD PRESSURE: 115 MMHG | HEART RATE: 79 BPM

## 2022-10-14 PROCEDURE — 99213 OFFICE O/P EST LOW 20 MIN: CPT | Mod: 25

## 2022-10-14 PROCEDURE — 31231 NASAL ENDOSCOPY DX: CPT

## 2022-10-14 NOTE — PHYSICAL EXAM
[Nasal Endoscopy Performed] : nasal endoscopy was performed, see procedure section for findings [de-identified] : slight saddle? [Midline] : trachea located in midline position [Normal] : no rashes

## 2022-10-14 NOTE — CONSULT LETTER
[Dear  ___] : Dear  [unfilled], [Courtesy Letter:] : I had the pleasure of seeing your patient, [unfilled], in my office today. [Consult Closing:] : Thank you very much for allowing me to participate in the care of this patient.  If you have any questions, please do not hesitate to contact me. [Sincerely,] : Sincerely, [FreeTextEntry3] : Julian Lyle MD\par Department of Otolaryngology - Head and Neck Surgery\par [facility]

## 2022-10-14 NOTE — PROCEDURE
[FreeTextEntry3] : Nasal Endoscopy:\par nasal airways patent\par septum intact with flap pink and healthy w complete perforation closure\par middle meati widely patent, no mucopus or polyps\par left nasal floor and posterior septum mucosalized

## 2022-10-14 NOTE — ASSESSMENT
[FreeTextEntry1] : 52F here in routine followup visit s/p ESS (maxillary, anterior ethmoid) and endoscopic repair of 2cm anterior septal perforation with pedicled anterior ethmoid artery flap 4/26/21. Intraoperatively, she had hypoplastic maxillaries; the pedicled flap had adequate perforation closure with minimal tension. She is doing very well since last seen 4 months ago. She is using the budesonide rinses twice weekly and mupirocin occasionally. She feels very well and is breathing great.\par On exam, nasal endoscopy shows well healed postoperative changes with an intact septum and a healthy flap w complete perforation closure! Both middle meati are patent and the left nasal floor/posterior septum are mucosalized. She is doing very well and her flap has healed. Continue budesonide rinses and mupirocin ointment as needed. RTO as needed.

## 2022-10-14 NOTE — HISTORY OF PRESENT ILLNESS
[de-identified] : 52F here in routine followup visit.\par \par She is s/p ESS (maxillary, anterior ethmoid) and endoscopic repair of 2cm anterior septal perforation with pedicled anterior ethmoid artery flap 4/26/21. Intraoperatively, she had hypoplastic maxillaries; the pedicled flap had adequate perforation closure with minimal tension.\par \par She is doing very well since last seen. She is using the budesonide rinses twice weekly and mupirocin occasionally. She feels very well and is breathing great.\par \par ROS otherwise unremarkable.

## 2022-10-14 NOTE — DATA REVIEWED
[de-identified] : CT Sinus 2/13/21:\par FINDINGS: The CT examination demonstrates the nasal septum to be deviated to the left. There is a approximately 18 mm defect within the cartilaginous nasal septum compatible with septal perforation. There is a small left-sided osseous spur. The cribriform plate is intact. The roof of the ethmoid sinus is lower. The rest of the osseous structures appear within normal limits. The vertical struts of the right middle turbinate is pneumatized with a twisted configuration. There is hypertrophy of the left middle turbinate.\par \par The frontal sinuses are well developed. There is an accessory septae within the right frontal sinus. There is a small intraseptal air cell which drains into the left frontal sinus. There is mucosal thickening within the right frontal sinus extending to the frontal sinus outflow tract. There is mucosal thickening within the left frontal sinus extending to the frontal sinus outflow tract. 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 There is mucosal thickening within anterior ethmoid air cells bilaterally. The left sphenoid sinus is underdeveloped with the right being dominant. The sphenoid sinus is clear. The sphenoethmoidal recesses are free of mucosal disease. The carotid canals are well covered by bone.\par \par The left maxillary sinus is underdeveloped. There is minimum mucosal thickening within the maxillary sinuses bilaterally (right greater than left). The osteomeatal complexes are free of mucosal disease.\par \par Evaluation of the nasopharynx demonstrates no masses.\par \par Limited evaluation of the brain parenchyma demonstrates no abnormalities.\par \par IMPRESSION: Leftward septal deviation with a approximately 18 mm septal perforation. Mild inflammatory sinus disease involving the anterior drainage pathways.

## 2022-11-11 ENCOUNTER — NON-APPOINTMENT (OUTPATIENT)
Age: 52
End: 2022-11-11

## 2022-11-11 ENCOUNTER — APPOINTMENT (OUTPATIENT)
Dept: OPHTHALMOLOGY | Facility: CLINIC | Age: 52
End: 2022-11-11

## 2022-11-11 PROCEDURE — 92133 CPTRZD OPH DX IMG PST SGM ON: CPT

## 2022-11-11 PROCEDURE — 92014 COMPRE OPH EXAM EST PT 1/>: CPT

## 2022-11-11 PROCEDURE — 92083 EXTENDED VISUAL FIELD XM: CPT

## 2023-04-14 ENCOUNTER — APPOINTMENT (OUTPATIENT)
Dept: OTOLARYNGOLOGY | Facility: CLINIC | Age: 53
End: 2023-04-14
Payer: MEDICAID

## 2023-04-14 PROCEDURE — 99213 OFFICE O/P EST LOW 20 MIN: CPT | Mod: 25

## 2023-04-14 PROCEDURE — 31231 NASAL ENDOSCOPY DX: CPT

## 2023-04-14 NOTE — PROCEDURE
[FreeTextEntry3] : Nasal Endoscopy:\par nasal airways patent\par septum intact with flap pink and healthy w complete perforation closure\par middle meati patent, no mucopus or polyps. left middle meatus w some edema and some mucus\par

## 2023-04-14 NOTE — PHYSICAL EXAM
[Nasal Endoscopy Performed] : nasal endoscopy was performed, see procedure section for findings [de-identified] : slight saddle? [Midline] : trachea located in midline position [Normal] : no rashes

## 2023-04-14 NOTE — HISTORY OF PRESENT ILLNESS
[de-identified] : 52F here in routine followup visit.\par \par She is s/p ESS (maxillary, anterior ethmoid) and endoscopic repair of 2cm anterior septal perforation with pedicled anterior ethmoid artery flap 4/26/21. Intraoperatively, she had hypoplastic maxillaries; the pedicled flap had adequate perforation closure with minimal tension.\par \par She is doing very well since last seen 6 months ago. Last month she was sick w coughing but that has mostly resolved. She was using the budesonide rinses but ran out. She feels very well and is breathing great.\par \par ROS otherwise unremarkable.

## 2023-04-14 NOTE — DATA REVIEWED
[de-identified] : CT Sinus 2/13/21:\par FINDINGS: The CT examination demonstrates the nasal septum to be deviated to the left. There is a approximately 18 mm defect within the cartilaginous nasal septum compatible with septal perforation. There is a small left-sided osseous spur. The cribriform plate is intact. The roof of the ethmoid sinus is lower. The rest of the osseous structures appear within normal limits. The vertical struts of the right middle turbinate is pneumatized with a twisted configuration. There is hypertrophy of the left middle turbinate.\par \par The frontal sinuses are well developed. There is an accessory septae within the right frontal sinus. There is a small intraseptal air cell which drains into the left frontal sinus. There is mucosal thickening within the right frontal sinus extending to the frontal sinus outflow tract. There is mucosal thickening within the left frontal sinus extending to the frontal sinus outflow tract. 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 There is mucosal thickening within anterior ethmoid air cells bilaterally. The left sphenoid sinus is underdeveloped with the right being dominant. The sphenoid sinus is clear. The sphenoethmoidal recesses are free of mucosal disease. The carotid canals are well covered by bone.\par \par The left maxillary sinus is underdeveloped. There is minimum mucosal thickening within the maxillary sinuses bilaterally (right greater than left). The osteomeatal complexes are free of mucosal disease.\par \par Evaluation of the nasopharynx demonstrates no masses.\par \par Limited evaluation of the brain parenchyma demonstrates no abnormalities.\par \par IMPRESSION: Leftward septal deviation with a approximately 18 mm septal perforation. Mild inflammatory sinus disease involving the anterior drainage pathways.

## 2023-04-14 NOTE — ASSESSMENT
[FreeTextEntry1] : 52F here in routine followup visit s/p ESS (maxillary, anterior ethmoid) and endoscopic repair of 2cm anterior septal perforation with pedicled anterior ethmoid artery flap 4/26/21. Intraoperatively, she had hypoplastic maxillaries; the pedicled flap had adequate perforation closure with minimal tension. She is doing very well since last seen 6 months ago. Last month she was sick w coughing but that has mostly resolved. She was using the budesonide rinses but ran out. She feels very well and is breathing great.\par On exam, nasal endoscopy shows well healed postoperative changes with an intact septum and a healthy flap w complete perforation closure. Both middle meati are patent and the left nasal floor/posterior septum are mucosalized. There is mild left middle meatal edema and mild mucus in the left antrum.\par She is doing very well. Restart budesonide rinses daily. RTO as needed.

## 2023-04-14 NOTE — CONSULT LETTER
[Dear  ___] : Dear  [unfilled], [Courtesy Letter:] : I had the pleasure of seeing your patient, [unfilled], in my office today. [Consult Closing:] : Thank you very much for allowing me to participate in the care of this patient.  If you have any questions, please do not hesitate to contact me. [Sincerely,] : Sincerely, [FreeTextEntry3] : Julian Lyle MD\par Department of Otolaryngology - Head and Neck Surgery\par [facility]

## 2023-05-12 ENCOUNTER — APPOINTMENT (OUTPATIENT)
Dept: OPHTHALMOLOGY | Facility: CLINIC | Age: 53
End: 2023-05-12

## 2023-11-03 ENCOUNTER — APPOINTMENT (OUTPATIENT)
Dept: OTOLARYNGOLOGY | Facility: CLINIC | Age: 53
End: 2023-11-03
Payer: MEDICAID

## 2023-11-03 VITALS
TEMPERATURE: 97.6 F | HEIGHT: 60 IN | DIASTOLIC BLOOD PRESSURE: 82 MMHG | WEIGHT: 186 LBS | HEART RATE: 84 BPM | BODY MASS INDEX: 36.52 KG/M2 | SYSTOLIC BLOOD PRESSURE: 104 MMHG

## 2023-11-03 PROCEDURE — 31231 NASAL ENDOSCOPY DX: CPT

## 2023-11-03 PROCEDURE — 99213 OFFICE O/P EST LOW 20 MIN: CPT | Mod: 25

## 2024-03-08 ENCOUNTER — APPOINTMENT (OUTPATIENT)
Dept: PULMONOLOGY | Facility: CLINIC | Age: 54
End: 2024-03-08
Payer: MEDICAID

## 2024-03-08 VITALS
OXYGEN SATURATION: 99 % | HEIGHT: 60 IN | TEMPERATURE: 97.7 F | WEIGHT: 166 LBS | HEART RATE: 90 BPM | DIASTOLIC BLOOD PRESSURE: 86 MMHG | SYSTOLIC BLOOD PRESSURE: 112 MMHG | BODY MASS INDEX: 32.59 KG/M2

## 2024-03-08 DIAGNOSIS — R06.89 DYSPNEA, UNSPECIFIED: ICD-10-CM

## 2024-03-08 DIAGNOSIS — R06.00 DYSPNEA, UNSPECIFIED: ICD-10-CM

## 2024-03-08 PROCEDURE — 99213 OFFICE O/P EST LOW 20 MIN: CPT | Mod: 25

## 2024-03-08 PROCEDURE — 94010 BREATHING CAPACITY TEST: CPT

## 2024-03-08 RX ORDER — CEFUROXIME AXETIL 500 MG/1
500 TABLET ORAL
Qty: 14 | Refills: 0 | Status: DISCONTINUED | COMMUNITY
Start: 2021-04-26 | End: 2024-03-08

## 2024-03-08 RX ORDER — SODIUM CHLORIDE 0.65 %
0.65 AEROSOL, SPRAY (ML) NASAL
Qty: 2 | Refills: 5 | Status: DISCONTINUED | COMMUNITY
Start: 2021-04-26 | End: 2024-03-08

## 2024-03-08 RX ORDER — MUPIROCIN 20 MG/G
2 OINTMENT TOPICAL TWICE DAILY
Qty: 1 | Refills: 0 | Status: DISCONTINUED | COMMUNITY
Start: 2021-05-21 | End: 2024-03-08

## 2024-03-08 RX ORDER — FLUTICASONE PROPIONATE 50 UG/1
50 SPRAY, METERED NASAL
Refills: 0 | Status: DISCONTINUED | COMMUNITY
End: 2024-03-08

## 2024-03-08 RX ORDER — OXYCODONE AND ACETAMINOPHEN 5; 325 MG/1; MG/1
5-325 TABLET ORAL
Qty: 30 | Refills: 0 | Status: DISCONTINUED | COMMUNITY
Start: 2021-04-26 | End: 2024-03-08

## 2024-03-08 RX ORDER — METHYLPREDNISOLONE 4 MG/1
4 TABLET ORAL
Qty: 1 | Refills: 0 | Status: DISCONTINUED | COMMUNITY
Start: 2021-04-26 | End: 2024-03-08

## 2024-03-08 RX ORDER — OMEPRAZOLE 40 MG/1
40 CAPSULE, DELAYED RELEASE ORAL
Qty: 30 | Refills: 3 | Status: DISCONTINUED | COMMUNITY
Start: 2021-11-03 | End: 2024-03-08

## 2024-03-08 RX ORDER — SODIUM CHLORIDE 0.65 %
0.65 AEROSOL, SPRAY (ML) NASAL
Refills: 0 | Status: DISCONTINUED | COMMUNITY
End: 2024-03-08

## 2024-03-08 RX ORDER — BUDESONIDE 0.5 MG/2ML
0.5 INHALANT ORAL
Qty: 1 | Refills: 2 | Status: DISCONTINUED | COMMUNITY
Start: 2021-05-21 | End: 2024-03-08

## 2024-03-11 NOTE — ASSESSMENT
[FreeTextEntry1] : Data reviewed:  PA/lat CXR in office 11/03/2021 : clear lungs  PFT 11/03/2021 : entirely normal, no BD response / FENO 18 MCT 4/28/22: negative for BHR [place] 3/8/2024: normal  Impression: Episodic rest dyspnea after ? viral syndrome  Plan: Get CXR. Further workup pending that.

## 2024-03-11 NOTE — PHYSICAL EXAM
[No Acute Distress] : no acute distress [Normal S1, S2] : normal s1, s2 [Normal Rate/Rhythm] : normal rate/rhythm [No Murmurs] : no murmurs [No Resp Distress] : no resp distress [No Clubbing] : no clubbing [Clear to Auscultation Bilaterally] : clear to auscultation bilaterally [Normal Affect] : normal affect [TextBox_105] : L calf circumference > R, no tenderness

## 2024-03-11 NOTE — HISTORY OF PRESENT ILLNESS
[TextBox_4] : 11/3/21 [Nildaer]: Ms. Serna is a 50 yo F, never smoker with no significant PMHx who presents for a chronic cough. She reports she had an episode of significant epistaxis in 3/2020 for which she was hospitalized at [facility] and caught covid w/ course c/b PE (took eliquis for 6 months) and development of a perforated septum. She saw ENT 4/2021 and underwent restorative surgery and currently is taking Flonase twice weekly and budesonide nasal rinse daily. Since her COVID-19 infection, has had dry cough, worse at night, with some occasional wheezing. Feels some PINEDO, occasional SOB when sitting at home. About 1 month ago she had left sided chest pain for which she went to [facility] and they found nothing. Denies any heartburn or GERD symptoms. No personal or family history of asthma. 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 No pets, birds or mold at home.   4/28/22: She tried the PPI for one month w no improvement but also saw GI and told no evidence of reflux, had endoscopy, stopped the PPI. The cough is no better. Happens at night sometimes and at least once a week. Does say this time that it may happen when exposed to products at work. Also complains of chest pressure on coughing or with exertion but has seen cards and no heart disease.  3/8/2024: After seeing me she was well for quite some time and traveled to [place] about 2 mos ago where she had an acute illness characterized by cough and myalgias without fever. Home test neg for Covid. Since that time she has episodic dyspnea and pleuritic pain, mild, in her L upper back. She takes ibuprofen for this. The dyspnea comes and goes, not every day, can be day or night, can be at rest. Lasts 5 min when it occurs. When she feels it she starts taking deep breaths. She does note having L knee and leg pain for more than 6 mos, has seen ortho, and told everything ok, just arthritis. She also notes that her L leg has been larger than the R for some years and a cardiologist has done an ultrasound which was normal.

## 2024-05-03 ENCOUNTER — APPOINTMENT (OUTPATIENT)
Dept: OTOLARYNGOLOGY | Facility: CLINIC | Age: 54
End: 2024-05-03
Payer: MEDICAID

## 2024-05-03 DIAGNOSIS — J32.9 CHRONIC SINUSITIS, UNSPECIFIED: ICD-10-CM

## 2024-05-03 DIAGNOSIS — J34.89 OTHER SPECIFIED DISORDERS OF NOSE AND NASAL SINUSES: ICD-10-CM

## 2024-05-03 PROCEDURE — 31231 NASAL ENDOSCOPY DX: CPT

## 2024-05-03 PROCEDURE — 99213 OFFICE O/P EST LOW 20 MIN: CPT | Mod: 25

## 2024-05-03 RX ORDER — BUDESONIDE 0.5 MG/2ML
0.5 INHALANT ORAL
Qty: 2 | Refills: 3 | Status: ACTIVE | COMMUNITY
Start: 2023-11-03 | End: 1900-01-01

## 2024-05-03 NOTE — PHYSICAL EXAM
[Nasal Endoscopy Performed] : nasal endoscopy was performed, see procedure section for findings [de-identified] : slight saddle? [Midline] : trachea located in midline position [Normal] : no rashes

## 2024-05-03 NOTE — ASSESSMENT
[FreeTextEntry1] : 53F here in routine followup visit. She is s/p ESS (maxillary, anterior ethmoid) and endoscopic repair of 2cm anterior septal perforation with pedicled anterior ethmoid artery flap 4/26/21. Intraoperatively, she had hypoplastic maxillaries; the pedicled flap had adequate perforation closure with minimal tension.  She is doing very well since last seen 7 months ago. She has no complaints and is breathing great. She ran out of the budesonide rinses and has not done them in a few weeks. On exam, nasal endoscopy shows well healed postoperative changes with an intact septum and complete perforation closure. Both middle meati are patent and maxillaries are widely patent w no layering fluid. She is doing very well. Restart budesonide rinses several times weekly. RTO as needed.

## 2024-05-03 NOTE — PROCEDURE
[FreeTextEntry3] : Nasal Endoscopy: nasal airways patent septum intact w complete perforation closure middle meati patent, no mucopus or polyps.

## 2024-05-03 NOTE — DATA REVIEWED
[de-identified] : CT Sinus 2/13/21:\par  FINDINGS: The CT examination demonstrates the nasal septum to be deviated to the left. There is a approximately 18 mm defect within the cartilaginous nasal septum compatible with septal perforation. There is a small left-sided osseous spur. The cribriform plate is intact. The roof of the ethmoid sinus is lower. The rest of the osseous structures appear within normal limits. The vertical struts of the right middle turbinate is pneumatized with a twisted configuration. There is hypertrophy of the left middle turbinate.\par  \par  The frontal sinuses are well developed. There is an accessory septae within the right frontal sinus. There is a small intraseptal air cell which drains into the left frontal sinus. There is mucosal thickening within the right frontal sinus extending to the frontal sinus outflow tract. There is mucosal thickening within the left frontal sinus extending to the frontal sinus outflow tract. There are bilateral type III frontal and bullar cells.\par  \par  There is a 2 mm osteoma within the anterior right ethmoid air cell. There is mucosal thickening within anterior ethmoid air cells bilaterally. The left sphenoid sinus is underdeveloped with the right being dominant. The sphenoid sinus is clear. The sphenoethmoidal recesses are free of mucosal disease. The carotid canals are well covered by bone.\par  \par  The left maxillary sinus is underdeveloped. There is minimum mucosal thickening within the maxillary sinuses bilaterally (right greater than left). The osteomeatal complexes are free of mucosal disease.\par  \par  Evaluation of the nasopharynx demonstrates no masses.\par  \par  Limited evaluation of the brain parenchyma demonstrates no abnormalities.\par  \par  IMPRESSION: Leftward septal deviation with a approximately 18 mm septal perforation. Mild inflammatory sinus disease involving the anterior drainage pathways.

## 2024-05-03 NOTE — CONSULT LETTER
[Dear  ___] : Dear  [unfilled], [Courtesy Letter:] : I had the pleasure of seeing your patient, [unfilled], in my office today. [Consult Closing:] : Thank you very much for allowing me to participate in the care of this patient.  If you have any questions, please do not hesitate to contact me. [Sincerely,] : Sincerely, [FreeTextEntry3] : Julian Lyle MD\par  Department of Otolaryngology - Head and Neck Surgery\par  [facility]

## 2024-05-03 NOTE — HISTORY OF PRESENT ILLNESS
[de-identified] : 53F here in routine followup visit.  She is s/p ESS (maxillary, anterior ethmoid) and endoscopic repair of 2cm anterior septal perforation with pedicled anterior ethmoid artery flap 4/26/21. Intraoperatively, hypoplastic maxillaries; the pedicled flap had adequate perforation closure with minimal tension.  She is doing very well since last seen 7 months ago. She has no complaints and is breathing great. She ran out of the budesonide rinses and has not done them in a few weeks.  ROS otherwise unremarkable.

## 2024-12-13 ENCOUNTER — APPOINTMENT (OUTPATIENT)
Dept: OTOLARYNGOLOGY | Facility: CLINIC | Age: 54
End: 2024-12-13
Payer: MEDICAID

## 2024-12-13 ENCOUNTER — NON-APPOINTMENT (OUTPATIENT)
Age: 54
End: 2024-12-13

## 2024-12-13 DIAGNOSIS — J32.9 CHRONIC SINUSITIS, UNSPECIFIED: ICD-10-CM

## 2024-12-13 DIAGNOSIS — J34.89 OTHER SPECIFIED DISORDERS OF NOSE AND NASAL SINUSES: ICD-10-CM

## 2024-12-13 PROCEDURE — 31231 NASAL ENDOSCOPY DX: CPT

## 2024-12-13 PROCEDURE — 99213 OFFICE O/P EST LOW 20 MIN: CPT | Mod: 25

## 2025-06-06 ENCOUNTER — APPOINTMENT (OUTPATIENT)
Dept: OTOLARYNGOLOGY | Facility: CLINIC | Age: 55
End: 2025-06-06
Payer: MEDICAID

## 2025-06-06 ENCOUNTER — NON-APPOINTMENT (OUTPATIENT)
Age: 55
End: 2025-06-06

## 2025-06-06 PROCEDURE — 99214 OFFICE O/P EST MOD 30 MIN: CPT | Mod: 25

## 2025-06-06 PROCEDURE — 31231 NASAL ENDOSCOPY DX: CPT

## 2025-06-06 RX ORDER — AZITHROMYCIN 250 MG/1
250 TABLET, FILM COATED ORAL
Qty: 1 | Refills: 0 | Status: ACTIVE | COMMUNITY
Start: 2025-06-06 | End: 1900-01-01